# Patient Record
Sex: FEMALE | Race: BLACK OR AFRICAN AMERICAN | NOT HISPANIC OR LATINO | URBAN - METROPOLITAN AREA
[De-identification: names, ages, dates, MRNs, and addresses within clinical notes are randomized per-mention and may not be internally consistent; named-entity substitution may affect disease eponyms.]

---

## 2024-02-27 ENCOUNTER — EMERGENCY (EMERGENCY)
Facility: HOSPITAL | Age: 24
LOS: 1 days | Discharge: HOME | End: 2024-02-27
Attending: EMERGENCY MEDICINE | Admitting: EMERGENCY MEDICINE
Payer: MEDICAID

## 2024-02-27 VITALS
SYSTOLIC BLOOD PRESSURE: 111 MMHG | OXYGEN SATURATION: 100 % | DIASTOLIC BLOOD PRESSURE: 59 MMHG | WEIGHT: 179.9 LBS | RESPIRATION RATE: 17 BRPM | HEIGHT: 65 IN | HEART RATE: 67 BPM | TEMPERATURE: 98 F

## 2024-02-27 LAB
HCG UR QL: NEGATIVE — SIGNIFICANT CHANGE UP
HIV 1 & 2 AB SERPL IA.RAPID: SIGNIFICANT CHANGE UP

## 2024-02-27 PROCEDURE — 99284 EMERGENCY DEPT VISIT MOD MDM: CPT

## 2024-02-27 PROCEDURE — 36415 COLL VENOUS BLD VENIPUNCTURE: CPT

## 2024-02-27 PROCEDURE — 86703 HIV-1/HIV-2 1 RESULT ANTBDY: CPT

## 2024-02-27 PROCEDURE — 87491 CHLMYD TRACH DNA AMP PROBE: CPT

## 2024-02-27 PROCEDURE — 87591 N.GONORRHOEAE DNA AMP PROB: CPT

## 2024-02-27 PROCEDURE — 86780 TREPONEMA PALLIDUM: CPT

## 2024-02-27 PROCEDURE — 81025 URINE PREGNANCY TEST: CPT

## 2024-02-27 RX ORDER — CEFTRIAXONE 500 MG/1
500 INJECTION, POWDER, FOR SOLUTION INTRAMUSCULAR; INTRAVENOUS ONCE
Refills: 0 | Status: COMPLETED | OUTPATIENT
Start: 2024-02-27 | End: 2024-02-27

## 2024-02-27 RX ADMIN — Medication 100 MILLIGRAM(S): at 19:15

## 2024-02-27 RX ADMIN — CEFTRIAXONE 500 MILLIGRAM(S): 500 INJECTION, POWDER, FOR SOLUTION INTRAMUSCULAR; INTRAVENOUS at 19:22

## 2024-02-27 NOTE — ED ADULT TRIAGE NOTE - [place]OW COMA SCALE: SCORE, MLM
Pt presents with vomiting, generalized weakness, cough, H/A and abdominal pain. Pt states symptoms started 11/8/22 and getting worse. Per pt, son told him he fainted this morning but he does not remember. Hx of diabetes, HTN and hyperlipemia. Pt has not checked blood glucose today. Pain 8/0-10. 15

## 2024-02-27 NOTE — ED ADULT NURSE NOTE - OBJECTIVE STATEMENT
patient A&Ox3 in no acute distress c/o of vaginal lump and discharge patient is here for STD test and requested HIV TESTING , NO OTHER C/O AT THIS TIME

## 2024-02-27 NOTE — ED PROVIDER NOTE - OBJECTIVE STATEMENT
Patient is a 23-year-old female with no significant past medical history presents with possible STD exposure.  Patient reports she had intercourse unprotected with multiple male partners.  Patient concerned for STDs at this time.  Patient also notes lump on external genitalia.  Patient has OB/GYN but cannot remember her name.  Patient denies fever chest pain shortness of breath nausea vomiting abdominal pain dysuria vaginal discharge vaginal bleeding.

## 2024-02-27 NOTE — ED PROVIDER NOTE - CARE PROVIDER_API CALL
Yoni Silva  Obstetrics and Gynecology  [address] [place] [id number]  Phone: [phone]  Fax: [phone]  Follow Up Time: 4-6 Days

## 2024-02-27 NOTE — ED PROVIDER NOTE - CLINICAL SUMMARY MEDICAL DECISION MAKING FREE TEXT BOX
23-year-old female with no significant past medical history presents with possible STD exposure.  Patient reports she had intercourse unprotected with multiple male partners.  Patient concerned for STDs at this time.  Patient also notes lump on external genitalia.  Patient has OB/GYN but cannot remember her name.  Patient denies fever chest pain shortness of breath nausea vomiting abdominal pain dysuria vaginal discharge vaginal bleeding.    Physical exam vital signs stable afebrile no distress.  Abdomen soft nontender.  GYN exam done by PA revealed no external masses tenderness discharge or lesions.  Impression is possible STD exposure.  Plan is STD workup and treatment and PCP follow-up.

## 2024-02-27 NOTE — ED ADULT NURSE REASSESSMENT NOTE - NS ED NURSE REASSESS COMMENT FT1
patient A&Ox3 in no acute distress discharge as orders no heplock refuses the discharge vital sign , left ER self ambulated

## 2024-02-27 NOTE — ED PROVIDER NOTE - PROGRESS NOTE DETAILS
discussed safe sex. advised she must use protection. advised obgyn followup. will tx for sti ppx.  All questions answered and concerns addressed. pt verbalized understanding and agreement with plan and dx. pt advised on next step and when/where to follow up. pt advised on all take home and otc medications. pt advised to follow up with PMD. pt advised to return to ed for worsenng symptoms including fever, cp, sob. will dc.

## 2024-02-27 NOTE — ED PROVIDER NOTE - NSFOLLOWUPINSTRUCTIONS_ED_ALL_ED_FT
safe Sex  1. FOLLOW UP WITH YOUR PRIMARY DOCTOR IN 24-48 HOURS.   2. FOLLOW UP WITH ALL SPECIALIST DISCUSSED DURING YOUR VISIT.   3. TAKE ALL MEDICATIONS PRESCRIBED IN THE ER IF ANY ARE PRESCRIBED. CONTINUE YOUR HOME MEDICATIONS UNLESS OTHERWISE ADVISED DIFFERENTLY.   4. RETURN FOR WORSENING SYMPTOMS OR CONCERNS INCLUDING BUT NOT LIMITED TO FEVER, CHEST PAIN, OR TROUBLE BREATHING OR ANY OTHER CONCERNS  doxycycine 1 Tablet twice daily for 7 days   Practicing safe sex means taking steps before and during sex to reduce your risk of:  Getting an STI (sexually transmitted infection).  Giving your partner an STI.  Unwanted or unplanned pregnancy.  How to practice safe sex  Ways you can practice safe sex      Limit your sexual partners to only one partner who is having sex with only you.  Avoid using alcohol and drugs before having sex. Alcohol and drugs can affect your judgment.  Before having sex with a new partner:  Talk to your partner about past partners, past STIs, and drug use.  Get screened for STIs and discuss the results with your partner. Ask your partner to get screened too.  Check your body regularly for sores, blisters, rashes, or unusual discharge. If you notice any of these problems, visit your health care provider.  Avoid sexual contact if you have symptoms of an infection or you are being treated for an STI.  While having sex, use a condom. Make sure to:  Use a condom every time you have vaginal, oral, or anal sex. Both females and males should wear condoms during oral sex.  Keep condoms in place from the beginning to the end of sexual activity.  Use a latex condom, if possible. Latex condoms offer the best protection.  Use only water-based lubricants with a condom. Using petroleum-based lubricants or oils will weaken the condom and increase the chance that it will break.  Ways your health care provider can help you practice safe sex      See your health care provider for regular screenings, exams, and tests for STIs.  Talk with your health care provider about what kind of birth control (contraception) is best for you.  Get vaccinated against hepatitis B and human papillomavirus (HPV).  If you are at risk of being infected with HIV (human immunodeficiency virus), talk with your health care provider about taking a prescription medicine to prevent HIV infection. You are at risk for HIV if you:  Are a man who has sex with other men.  Are sexually active with more than one partner.  Take drugs by injection.  Have a sex partner who has HIV.  Have unprotected sex.  Have sex with someone who has sex with both men and women.  Have had an STI.  Follow these instructions at home:  Take over-the-counter and prescription medicines only as told by your health care provider.  Keep all follow-up visits. This is important.  Where to find more information  Centers for Disease Control and Prevention: www.cdc.gov  Planned Parenthood: www.plannedparenthood.org  Office on Women's Health: www.womenshealth.gov  Summary  Practicing safe sex means taking steps before and during sex to reduce your risk getting an STI, giving your partner an STI, and having an unwanted or unplanned pregnancy.  Before having sex with a new partner, talk to your partner about past partners, past STIs, and drug use.  Use a condom every time you have vaginal, oral, or anal sex. Both females and males should wear condoms during oral sex.  Check your body regularly for sores, blisters, rashes, or unusual discharge. If you notice any of these problems, visit your health care provider.  See your health care provider for regular screenings, exams, and tests for STIs.  This information is not intended to replace advice given to you by your health care provider. Make sure you discuss any questions you have with your health care provider.    Document Revised: 05/24/2021 Document Reviewed: 05/24/2021  [employer] Patient Education © 2023 [employer]  [employer] logo  Terms and Conditions  Privacy Policy  Editorial Policy  All content on this site: Copyright © 2024 [employer], its licensors, and contributors. All rights are reserved, including those for text and data mining, AI training, and similar technologies. For all open access content, the Creative Commons licensing terms apply.  Cookies are used by this site. To decline or learn more, visit our Cookies page.

## 2024-02-27 NOTE — ED ADULT TRIAGE NOTE - CHIEF COMPLAINT QUOTE
24 y/o female presents axo4 ambulatory c/o painful 'lump' developing near vaginal/perneal area x3 weeks. pt also reports concerns for venereal diseases and is requesting HIV testing.

## 2024-02-27 NOTE — ED PROVIDER NOTE - PATIENT PORTAL LINK FT
You can access the FollowMyHealth Patient Portal offered by [facility] by registering at the following website: [email or web address]. By joining [facility] [employer]’s FollowMyHealth portal, you will also be able to view your health information using other applications (apps) compatible with our system.

## 2024-02-28 LAB
C TRACH RRNA SPEC QL NAA+PROBE: DETECTED
N GONORRHOEA RRNA SPEC QL NAA+PROBE: SIGNIFICANT CHANGE UP
SPECIMEN SOURCE: SIGNIFICANT CHANGE UP
T PALLIDUM AB TITR SER: NEGATIVE — SIGNIFICANT CHANGE UP

## 2024-02-29 NOTE — ED POST DISCHARGE NOTE - DETAILS
pt called the ED for results. she was informed of the +chlamydia, she has not started her doxy Rx, but will start it today

## 2024-09-30 ENCOUNTER — APPOINTMENT (OUTPATIENT)
Dept: OBGYN | Facility: CLINIC | Age: 24
End: 2024-09-30
Payer: MEDICAID

## 2024-09-30 VITALS
SYSTOLIC BLOOD PRESSURE: 117 MMHG | WEIGHT: 217 LBS | HEIGHT: 65 IN | BODY MASS INDEX: 36.15 KG/M2 | DIASTOLIC BLOOD PRESSURE: 78 MMHG | HEART RATE: 90 BPM

## 2024-09-30 DIAGNOSIS — Z01.411 ENCOUNTER FOR GYNECOLOGICAL EXAMINATION (GENERAL) (ROUTINE) WITH ABNORMAL FINDINGS: ICD-10-CM

## 2024-09-30 DIAGNOSIS — N91.5 OLIGOMENORRHEA, UNSPECIFIED: ICD-10-CM

## 2024-09-30 PROBLEM — Z00.00 ENCOUNTER FOR PREVENTIVE HEALTH EXAMINATION: Status: ACTIVE | Noted: 2024-09-30

## 2024-09-30 PROCEDURE — 76830 TRANSVAGINAL US NON-OB: CPT

## 2024-09-30 PROCEDURE — 99385 PREV VISIT NEW AGE 18-39: CPT

## 2024-09-30 NOTE — PHYSICAL EXAM
[Appropriately responsive] : appropriately responsive [Alert] : alert [Soft] : soft [Non-tender] : non-tender [Non-distended] : non-distended [No Mass] : no mass [FreeTextEntry3] : The thyroid is nonenlarged and nontender.  There is no goiter or lymphadenopathy. [FreeTextEntry6] : Soft.  Nontender.  There are no predominant nodules or lymphadenopathy. [Labia Majora] : normal [Labia Minora] : normal [Normal] : normal [Uterine Adnexae] : normal [FreeTextEntry4] : A small amount of blood is noted within the vaginal vault. [FreeTextEntry5] : The cervix is normal in appearance.  A Pap smear and cervical cultures were obtained.  There was no cervical motion tenderness.

## 2024-09-30 NOTE — HISTORY OF PRESENT ILLNESS
[TextBox_4] : 24-year-old  0 LMP 2024 presents for her first gynecological examination.  The patient presents complaining infrequent menses occurring every few months.  She notes mild dysmenorrhea.

## 2024-09-30 NOTE — REASON FOR VISIT
[Initial] : an initial consultation for [FreeTextEntry2] : pt is here to establish care and annual exam. LMP 07/2024

## 2024-09-30 NOTE — PROCEDURE
[Transvaginal Ultrasound] : transvaginal ultrasound [Anteverted] : anteverted [No Fibroid(s)] : no fibroid(s) [L: ___ cm] : L: [unfilled] cm [W: ___cm] : W: [unfilled] cm [H: ___ cm] : H: [unfilled] cm [FreeTextEntry9] : irregular periods [FreeTextEntry7] : 3.52 x 2.84 x 2.30 cm [FreeTextEntry8] : 3.15 x 2.81 x 2.73 cm [FreeTextEntry4] : As per Dr. Walker, this transvaginal ultrasound was performed. The uterus is heterogeneous and anteverted. The endometrium is homogeneous and measures 9.53 mm. The cervix measures 2.79 cm. The right ovary has a polycystic appearance. The left ovary has a polycystic appearance. No adnexal masses

## 2024-09-30 NOTE — PLAN
[FreeTextEntry1] : Wellness exam.  Normal physical examination. Reviewed self breast examination. Information given on Gardasil vaccination. Information given on STDs. The patient is sexually active with the same sex and does not require contraception.  Oligomenorrhea.  Will obtain blood work and pelvic ultrasound.  Reviewed proper nutrition and physical exercise. Discussed age-appropriate vaccinations.

## 2024-10-01 LAB
C TRACH RRNA SPEC QL NAA+PROBE: NOT DETECTED
N GONORRHOEA RRNA SPEC QL NAA+PROBE: NOT DETECTED
SOURCE TP AMPLIFICATION: NORMAL

## 2024-10-07 LAB — CYTOLOGY CVX/VAG DOC THIN PREP: NORMAL
